# Patient Record
Sex: MALE | Race: OTHER | HISPANIC OR LATINO | URBAN - METROPOLITAN AREA
[De-identification: names, ages, dates, MRNs, and addresses within clinical notes are randomized per-mention and may not be internally consistent; named-entity substitution may affect disease eponyms.]

---

## 2017-09-20 ENCOUNTER — EMERGENCY (EMERGENCY)
Facility: HOSPITAL | Age: 22
LOS: 1 days | Discharge: HOME | End: 2017-09-20
Attending: EMERGENCY MEDICINE
Payer: COMMERCIAL

## 2017-09-20 VITALS
OXYGEN SATURATION: 98 % | TEMPERATURE: 99 F | DIASTOLIC BLOOD PRESSURE: 82 MMHG | HEIGHT: 66 IN | SYSTOLIC BLOOD PRESSURE: 126 MMHG | RESPIRATION RATE: 16 BRPM | HEART RATE: 99 BPM | WEIGHT: 149.91 LBS

## 2017-09-20 PROCEDURE — 99284 EMERGENCY DEPT VISIT MOD MDM: CPT

## 2017-09-20 PROCEDURE — 99282 EMERGENCY DEPT VISIT SF MDM: CPT

## 2017-09-20 RX ORDER — FLUTICASONE PROPIONATE 50 MCG
1 SPRAY, SUSPENSION NASAL
Qty: 1 | Refills: 0 | OUTPATIENT
Start: 2017-09-20 | End: 2017-09-30

## 2017-09-20 NOTE — ED STATDOCS - MEDICAL DECISION MAKING DETAILS
H&P consistent with sinusitis. Sx present x 1.5 weeks, and as such viral should have resolved. pt is well appearing, with supple neck, no sign of meningitis nor mastoid tenderness. Will tx with Abx and flonase, advised motrin, and to return for high fevers, neck pain. HA is consistent with sinus HA

## 2017-09-20 NOTE — ED STATDOCS - RIGHT FACE FINDINGS, MLM
no lymphadenopathy b/l ant/post cervical nodes, left maxillary sinus tender to percussion, non-tender left frontal sinus

## 2017-09-20 NOTE — ED STATDOCS - OBJECTIVE STATEMENT
22 y/o M presents to ED c/o L ear pain which onset one week ago. He also has head pain and sinus pressure. Pt says he feels congested. He had a strep done yesterday and was told to take Motrin. He took Motrin earlier this morning. Pt also has cough and has chills. Pt denies fevers. No further complaints at this time. 20 y/o M presents to ED c/o L ear pain which onset one week ago. He also has head pain and sinus pressure, congestion, non-productive cough. Pt says he feels congested. He had a strep done yesterday at urgent care, was told that it was negative, and was told to take Motrin. He took Motrin early this morning. Pt also has cough and has chills. Pt denies fevers. No further complaints at this time. He denies neck pain, he reports that HA as a frontal throbbing HA that wraps around to his temple.

## 2017-09-20 NOTE — ED STATDOCS - MUSCULOSKELETAL, MLM
range of motion is not limited and there is no muscle tenderness. range of motion is not limited and there is no muscle tenderness. Neck is supple, full ROM with no pain

## 2019-03-10 ENCOUNTER — EMERGENCY (EMERGENCY)
Facility: HOSPITAL | Age: 24
LOS: 1 days | Discharge: HOME | End: 2019-03-10
Attending: STUDENT IN AN ORGANIZED HEALTH CARE EDUCATION/TRAINING PROGRAM
Payer: COMMERCIAL

## 2019-03-10 VITALS
OXYGEN SATURATION: 98 % | TEMPERATURE: 98 F | SYSTOLIC BLOOD PRESSURE: 126 MMHG | RESPIRATION RATE: 18 BRPM | HEIGHT: 66 IN | DIASTOLIC BLOOD PRESSURE: 81 MMHG | HEART RATE: 84 BPM | WEIGHT: 149.91 LBS

## 2019-03-10 PROCEDURE — 99283 EMERGENCY DEPT VISIT LOW MDM: CPT | Mod: 25

## 2019-03-10 PROCEDURE — 99283 EMERGENCY DEPT VISIT LOW MDM: CPT

## 2019-03-10 RX ORDER — IBUPROFEN 200 MG
600 TABLET ORAL ONCE
Qty: 0 | Refills: 0 | Status: COMPLETED | OUTPATIENT
Start: 2019-03-10 | End: 2019-03-10

## 2019-03-10 RX ADMIN — Medication 600 MILLIGRAM(S): at 07:44

## 2019-03-10 NOTE — ED PROVIDER NOTE - ENMT, MLM
Airway patent, Nasal mucosa clear. Mouth with normal mucosa. Throat has no vesicles, no oropharyngeal exudates and uvula is midline. Right TM mildly erythematous with some fluid behind TM. Non-bulging TM. Normal appearing TM on left.

## 2019-03-10 NOTE — ED PROVIDER NOTE - ATTENDING CONTRIBUTION TO CARE
I personally saw the patient with the PA, and completed the key components of the history and physical exam. I then discussed the management plan with the PA.  right ear pain, mild erythema in the setting of uri symptoms - likely viral - analgesic, reassess, follow up with pmd

## 2019-03-10 NOTE — ED PROVIDER NOTE - OBJECTIVE STATEMENT
23y male no pmhx presents to ED for right ear pain since midnight this AM. Pt states he had some cold/URI sx earlier this week and mild headache. He notes a pressure sensation in the R ear and that every time he coughs he feels pain in the ear. Pt has not taken anything for his symptoms. No sick contacts. Pt sees PMD Dr. Parry. Pt denies fever, chills, n/v/d, nasal congestion, sore throat, dizziness, CP, SOB.

## 2019-03-10 NOTE — ED PROVIDER NOTE - CLINICAL SUMMARY MEDICAL DECISION MAKING FREE TEXT BOX
Likely fluid/sinus pressure. Will give ibuprofen for analgesia. Will send augmentin to pharmacy in case of early otitis media. pt instructed to take abx if pain persists or if develops fever. F/u PMD as outpt.
